# Patient Record
(demographics unavailable — no encounter records)

---

## 2024-12-09 NOTE — HISTORY OF PRESENT ILLNESS
[de-identified] : This patient presents today complaining of right hand swelling and pain.  Pain worse when he raises his fingers and extends them.  Pain localized to the dorsal aspect of the hand just distal to the wrist crease.  Pain level 5-6/10..  Patient recovered a few days.  Pain worse with gripping objects.  Pain is improved with rest.  Denies any injury.  He has had a history of a carpal tunnel release in the past.  He has been taking ibuprofen for the pain.  He presents today for evaluation regarding continued pain and swelling.

## 2024-12-09 NOTE — DISCUSSION/SUMMARY
[de-identified] : The patient presents today for evaluation regarding dorsal wrist pain and swelling.  Patient's physical exam and x-rays reveals evidence of some tenosynovitis of the fourth extensor compartment.  There is no to be focal pain and tenderness over the fourth compartment.  I recommend reduced activities.  He should avoid working on the gym.  He should avoid any repetitive activities with the right upper extremity.  He also placed on a course of meloxicam 15 mg each day for the next 2 weeks.  Instructions are given regarding taking meloxicam.  At least 30 minutes was spent performing the evaluation and management on today's office visit.  This includes but is not limited to preparing to see patient including review of any test results or outside medical records, obtaining and/or reviewing separately obtained history, performing examination and evaluation, counseling and educating the patient on their diagnosis and treatment recommendations, ordering medications, tests, or procedures, documenting clinical information in the electronic health record, independently interpreting results (not separately reported) and communicating results to the patient, and coordination of care.

## 2024-12-09 NOTE — PHYSICAL EXAM
[de-identified] : The patient appears well nourished  and in no apparent distress.  The patient is alert and oriented to person, place, and time.   Affect and mood appear normal.    The head is normocephalic and atraumatic.  The eyes reveal normal sclera and extra ocular muscles are intact.   The neck appears normal with no jugular venous distention or masses noted.   Skin shows normal turgor with no evidence of eczema or psoriasis.  No respiratory distress noted.  The patient ambulates with a normal gait.  The right wrist has full range of motion in flexion, extension, ulnar/radial deviation, pronation/supination.  Mild soft tissue swelling noted over the dorsal aspect of the wrist just distal to the wrist crease.  Tenderness to palpation noted.  No instability is noted.  There is no warmth or erythema.   strength is normal.  Pulses and capillary refill are normal.   No clubbing, cyanosis, or edema noted.  No lymphadenopathy noted.   [de-identified] : AP, lateral, and oblique views of the right hand were obtained.  The x-rays show that the joint spaces are well maintained without arthritic changes.  There is no evidence of fracture or dislocation.

## 2025-01-27 NOTE — PHYSICAL EXAM
[de-identified] : The patient appears well nourished  and in no apparent distress.  The patient is alert and oriented to person, place, and time.   Affect and mood appear normal.    The head is normocephalic and atraumatic.  The eyes reveal normal sclera and extra ocular muscles are intact.   The neck appears normal with no jugular venous distention or masses noted.   Skin shows normal turgor with no evidence of eczema or psoriasis.  No respiratory distress noted.  The patient ambulates with a normal gait.  The  left elbow has full range of motion in flexion/extension, as well as pronation/supination.  Mild discomfort noted with terminal extension.  Tenderness to palpation about the lateral epicondyle.  There is no soft tissue swelling.  There is no warmth or erythema.  There is no instability to varus or valgus stress.  Elbow strength is normal.  Strength and sensation are intact distally.    Pulses and capillary refill are normal.    No clubbing, cyanosis, or edema noted.  No lymphadenopathy noted.  [de-identified] : AP,  lateral, and oblique views of the left elbow were obtained.  There is no evidence of fracture, subluxation, or dislocation of the elbow.  The joint spaces are well maintained without evidence of degenerative arthritis.  No osteochondral lesions are noted.

## 2025-01-27 NOTE — PHYSICAL EXAM
[de-identified] : The patient appears well nourished  and in no apparent distress.  The patient is alert and oriented to person, place, and time.   Affect and mood appear normal.    The head is normocephalic and atraumatic.  The eyes reveal normal sclera and extra ocular muscles are intact.   The neck appears normal with no jugular venous distention or masses noted.   Skin shows normal turgor with no evidence of eczema or psoriasis.  No respiratory distress noted.  The patient ambulates with a normal gait.  The  left elbow has full range of motion in flexion/extension, as well as pronation/supination.  Mild discomfort noted with terminal extension.  Tenderness to palpation about the lateral epicondyle.  There is no soft tissue swelling.  There is no warmth or erythema.  There is no instability to varus or valgus stress.  Elbow strength is normal.  Strength and sensation are intact distally.    Pulses and capillary refill are normal.    No clubbing, cyanosis, or edema noted.  No lymphadenopathy noted.  [de-identified] : AP,  lateral, and oblique views of the left elbow were obtained.  There is no evidence of fracture, subluxation, or dislocation of the elbow.  The joint spaces are well maintained without evidence of degenerative arthritis.  No osteochondral lesions are noted.

## 2025-01-27 NOTE — DISCUSSION/SUMMARY
[de-identified] : This patient presents today with complaints of 1 month history of left lateral elbow pain.  Physical exam reveals evidence of lateral epicondylitis.  He has not had much in the way of treatment for the elbow pain.  I therefore recommend a course of meloxicam 15 mg each day for 2 weeks.  I also recommend reduced activities and avoidance of any weight lifting.  If symptoms do not improve in the next 2 weeks I consider an injection into the lateral epicondyle.  At least 30 minutes was spent performing the evaluation and management on today's office visit.  This includes but is not limited to preparing to see patient including review of any test results or outside medical records, obtaining and/or reviewing separately obtained history, performing examination and evaluation, counseling and educating the patient on their diagnosis and treatment recommendations, ordering medications, tests, or procedures, documenting clinical information in the electronic health record, independently interpreting results (not separately reported) and communicating results to the patient, and coordination of care.

## 2025-01-27 NOTE — HISTORY OF PRESENT ILLNESS
[de-identified] : This patient presents for evaluation regarding complaints of left elbow pain with radiation down towards the wrist and fingers.  This been going on 1 month.  Patient does do a lot of working at the gym and doing bench presses.  Pain level 6 out of 10.  Feels limited strength in the hands.  Takes Tylenol and uses ice for the pain.  Denies any specific injury.  Denies numbness and tingling or radicular symptoms.

## 2025-01-27 NOTE — HISTORY OF PRESENT ILLNESS
[de-identified] : This patient presents for evaluation regarding complaints of left elbow pain with radiation down towards the wrist and fingers.  This been going on 1 month.  Patient does do a lot of working at the gym and doing bench presses.  Pain level 6 out of 10.  Feels limited strength in the hands.  Takes Tylenol and uses ice for the pain.  Denies any specific injury.  Denies numbness and tingling or radicular symptoms.

## 2025-01-27 NOTE — DISCUSSION/SUMMARY
[de-identified] : This patient presents today with complaints of 1 month history of left lateral elbow pain.  Physical exam reveals evidence of lateral epicondylitis.  He has not had much in the way of treatment for the elbow pain.  I therefore recommend a course of meloxicam 15 mg each day for 2 weeks.  I also recommend reduced activities and avoidance of any weight lifting.  If symptoms do not improve in the next 2 weeks I consider an injection into the lateral epicondyle.  At least 30 minutes was spent performing the evaluation and management on today's office visit.  This includes but is not limited to preparing to see patient including review of any test results or outside medical records, obtaining and/or reviewing separately obtained history, performing examination and evaluation, counseling and educating the patient on their diagnosis and treatment recommendations, ordering medications, tests, or procedures, documenting clinical information in the electronic health record, independently interpreting results (not separately reported) and communicating results to the patient, and coordination of care.

## 2025-02-28 NOTE — HISTORY OF PRESENT ILLNESS
[de-identified] : This patient presents today for follow-up regarding left elbow lateral epicondylitis.  Did take meloxicam for 2 weeks.  He presents today with continued pain about the elbow.  He states the meloxicam helped but when he stopped the meloxicam the symptoms came back.  Pain level 6 out of 10.  Pain worse with reaching pulling pushing and lifting.  He notes weakness of  strength.  Denies radicular symptoms or numbness and tingling.  Pain is improved with rest.

## 2025-02-28 NOTE — DISCUSSION/SUMMARY
[de-identified] : This patient presents today for follow-up regarding left elbow lateral epicondylitis.  Patient is noting continued pain after stopping the meloxicam.  Due to the patient's continued pain I recommended and performed a steroid injection to the lateral epicondyle on today's visit.  Instructions are given postinjection for ice algesia to modification of activities.  If his symptoms do not improve or if they recur I would recommend an MRI for evaluation of the tendon to rule out any partial tearing of the common extensor tendon.  Patient will call us if the symptoms do not improve or if they recur.  At least 30 minutes was spent performing the evaluation and management on today's office visit.  This includes but is not limited to preparing to see patient including review of any test results or outside medical records, obtaining and/or reviewing separately obtained history, performing examination and evaluation, counseling and educating the patient on their diagnosis and treatment recommendations, ordering medications, tests, or procedures, documenting clinical information in the electronic health record, independently interpreting results (not separately reported) and communicating results to the patient, and coordination of care.

## 2025-02-28 NOTE — PHYSICAL EXAM
[de-identified] : The patient appears well nourished  and in no apparent distress.  The patient is alert and oriented to person, place, and time.   Affect and mood appear normal.    The head is normocephalic and atraumatic.  The eyes reveal normal sclera and extra ocular muscles are intact.   The neck appears normal with no jugular venous distention or masses noted.   Skin shows normal turgor with no evidence of eczema or psoriasis.  No respiratory distress noted.  The patient ambulates with a normal gait.  The  left elbow has full range of motion in flexion/extension, as well as pronation/supination.  Mild discomfort noted with terminal extension.  Tenderness to palpation about the lateral epicondyle.  There is no soft tissue swelling.  There is no warmth or erythema.  There is no instability to varus or valgus stress.  Elbow strength is normal.  Strength and sensation are intact distally.    Pulses and capillary refill are normal.    No clubbing, cyanosis, or edema noted.  No lymphadenopathy noted.